# Patient Record
Sex: FEMALE | Race: WHITE | NOT HISPANIC OR LATINO | Employment: FULL TIME | URBAN - METROPOLITAN AREA
[De-identification: names, ages, dates, MRNs, and addresses within clinical notes are randomized per-mention and may not be internally consistent; named-entity substitution may affect disease eponyms.]

---

## 2017-02-27 ENCOUNTER — HOSPITAL ENCOUNTER (OUTPATIENT)
Dept: LAB | Facility: MEDICAL CENTER | Age: 37
End: 2017-02-27
Attending: FAMILY MEDICINE
Payer: COMMERCIAL

## 2017-02-27 LAB
25(OH)D3 SERPL-MCNC: 14 NG/ML (ref 30–100)
ALBUMIN SERPL BCP-MCNC: 4.2 G/DL (ref 3.2–4.9)
ALBUMIN/GLOB SERPL: 1.1 G/DL
ALP SERPL-CCNC: 72 U/L (ref 30–99)
ALT SERPL-CCNC: 27 U/L (ref 2–50)
ANION GAP SERPL CALC-SCNC: 8 MMOL/L (ref 0–11.9)
AST SERPL-CCNC: 21 U/L (ref 12–45)
BILIRUB SERPL-MCNC: 0.4 MG/DL (ref 0.1–1.5)
BUN SERPL-MCNC: 11 MG/DL (ref 8–22)
CALCIUM SERPL-MCNC: 9.5 MG/DL (ref 8.5–10.5)
CHLORIDE SERPL-SCNC: 104 MMOL/L (ref 96–112)
CO2 SERPL-SCNC: 26 MMOL/L (ref 20–33)
CREAT SERPL-MCNC: 0.68 MG/DL (ref 0.5–1.4)
ERYTHROCYTE [DISTWIDTH] IN BLOOD BY AUTOMATED COUNT: 45.1 FL (ref 35.9–50)
EST. AVERAGE GLUCOSE BLD GHB EST-MCNC: 114 MG/DL
GLOBULIN SER CALC-MCNC: 4 G/DL (ref 1.9–3.5)
GLUCOSE SERPL-MCNC: 86 MG/DL (ref 65–99)
HBA1C MFR BLD: 5.6 % (ref 0–5.6)
HCT VFR BLD AUTO: 43.5 % (ref 37–47)
HGB BLD-MCNC: 14.2 G/DL (ref 12–16)
MCH RBC QN AUTO: 30.7 PG (ref 27–33)
MCHC RBC AUTO-ENTMCNC: 32.6 G/DL (ref 33.6–35)
MCV RBC AUTO: 94 FL (ref 81.4–97.8)
PLATELET # BLD AUTO: 238 K/UL (ref 164–446)
PMV BLD AUTO: 10.3 FL (ref 9–12.9)
POTASSIUM SERPL-SCNC: 4 MMOL/L (ref 3.6–5.5)
PROT SERPL-MCNC: 8.2 G/DL (ref 6–8.2)
RBC # BLD AUTO: 4.63 M/UL (ref 4.2–5.4)
SODIUM SERPL-SCNC: 138 MMOL/L (ref 135–145)
TSH SERPL DL<=0.005 MIU/L-ACNC: 1.14 UIU/ML (ref 0.3–3.7)
WBC # BLD AUTO: 4.7 K/UL (ref 4.8–10.8)

## 2017-02-27 PROCEDURE — 82306 VITAMIN D 25 HYDROXY: CPT

## 2017-02-27 PROCEDURE — 36415 COLL VENOUS BLD VENIPUNCTURE: CPT

## 2017-02-27 PROCEDURE — 84443 ASSAY THYROID STIM HORMONE: CPT

## 2017-02-27 PROCEDURE — 83704 LIPOPROTEIN BLD QUAN PART: CPT

## 2017-02-27 PROCEDURE — 83695 ASSAY OF LIPOPROTEIN(A): CPT

## 2017-02-27 PROCEDURE — 83036 HEMOGLOBIN GLYCOSYLATED A1C: CPT

## 2017-02-27 PROCEDURE — 85027 COMPLETE CBC AUTOMATED: CPT

## 2017-02-27 PROCEDURE — 80061 LIPID PANEL: CPT

## 2017-02-27 PROCEDURE — 80053 COMPREHEN METABOLIC PANEL: CPT

## 2017-03-01 LAB — LPA SERPL-MCNC: 66 MG/DL

## 2017-03-02 LAB
CHOLEST SERPL-MCNC: 165 MG/DL (ref 100–199)
HDL PARTICAL NO Q4363: 29.2 UMOL/L
HDL SERPL QN: 9.6 NM
HDLC SERPL-MCNC: 47 MG/DL
HLD.LARGE SERPL-SCNC: 8.9 UMOL/L
LDL MED SERPL QN: 21.7 NM
LDL SERPL QN: 21.7 NM
LDL SERPL-SCNC: 1020 NMOL/L
LDL SMALL SERPL-SCNC: 335 NMOL/L
LDL SMALL SERPL-SCNC: 335 NMOL/L
LDLC SERPL CALC-MCNC: 99 MG/DL (ref 0–99)
LP IR SCORE Q4364: <25
TRIGL SERPL-MCNC: 94 MG/DL (ref 0–149)
VLDL LARGE SERPL-SCNC: 2.1 NMOL/L
VLDL SERPL QN: 42.1 NM

## 2017-08-28 ENCOUNTER — OFFICE VISIT (OUTPATIENT)
Dept: MEDICAL GROUP | Facility: PHYSICIAN GROUP | Age: 37
End: 2017-08-28
Payer: COMMERCIAL

## 2017-08-28 VITALS
OXYGEN SATURATION: 97 % | DIASTOLIC BLOOD PRESSURE: 72 MMHG | TEMPERATURE: 97.9 F | WEIGHT: 170 LBS | HEART RATE: 72 BPM | BODY MASS INDEX: 31.28 KG/M2 | SYSTOLIC BLOOD PRESSURE: 110 MMHG | HEIGHT: 62 IN | RESPIRATION RATE: 14 BRPM

## 2017-08-28 DIAGNOSIS — E66.9 OBESITY, CLASS I, BMI 30-34.9: ICD-10-CM

## 2017-08-28 DIAGNOSIS — Z00.00 WELL ADULT EXAM: ICD-10-CM

## 2017-08-28 DIAGNOSIS — Z13.0 SCREENING FOR DEFICIENCY ANEMIA: ICD-10-CM

## 2017-08-28 DIAGNOSIS — Z13.29 SCREENING FOR ENDOCRINE DISORDER: ICD-10-CM

## 2017-08-28 DIAGNOSIS — Z13.6 SCREENING FOR CARDIOVASCULAR CONDITION: ICD-10-CM

## 2017-08-28 PROCEDURE — 99395 PREV VISIT EST AGE 18-39: CPT | Performed by: FAMILY MEDICINE

## 2017-08-28 ASSESSMENT — PATIENT HEALTH QUESTIONNAIRE - PHQ9: CLINICAL INTERPRETATION OF PHQ2 SCORE: 0

## 2017-08-28 NOTE — PROGRESS NOTES
"Chief Complaint   Patient presents with   • Establish Care       HISTORY OF PRESENT ILLNESS: Patient is a 37 y.o. female established patient here today for the following concerns:    1. Well adult exam  Here today to establish care.  Reports that she has been healthy to date.  She takes metoprolol for SVT but has been under good control.  Has struggled with weight despite good exercise and diet changes.  Would like labs done.  Due for PAP with her gynecologist and plans to schedule appointment.          Past Medical, Social, and Family history reviewed and updated in EPIC    Allergies:Review of patient's allergies indicates no known allergies.    Current Outpatient Prescriptions   Medication Sig Dispense Refill   • metoprolol (LOPRESSOR) 50 MG TABS Take 50 mg by mouth every day.       No current facility-administered medications for this visit.          ROS:  Review of Systems   Constitutional: Negative for fever, chills, weight loss and malaise/fatigue.   HENT: Negative for ear pain, nosebleeds, congestion, sore throat and neck pain.    Eyes: Negative for blurred vision.   Respiratory: Negative for cough, sputum production, shortness of breath and wheezing.    Cardiovascular: Negative for chest pain, palpitations,  and leg swelling.   Gastrointestinal: Negative for heartburn, nausea, vomiting, diarrhea and abdominal pain.   Genitourinary: Negative for dysuria, urgency and frequency.   Musculoskeletal: Negative for myalgias, back pain and joint pain.   Skin: Negative for rash and itching.   Neurological: Negative for dizziness, tingling, tremors, sensory change, focal weakness and headaches.   Endo/Heme/Allergies: Does not bruise/bleed easily.   Psychiatric/Behavioral: Negative for depression, anxiety, suicidal ideas, insomnia and memory loss.      Exam:  Blood pressure 110/72, pulse 72, temperature 36.6 °C (97.9 °F), resp. rate 14, height 1.575 m (5' 2\"), weight 77.1 kg (170 lb), SpO2 97 %.    General:  Well " nourished, well developed in NAD  Head is grossly normal.  Neck: Supple without JVD   Pulmonary:  Normal effort. CTAB  Cardiovascular: Regular rate and rhythm  Extremities: no clubbing, cyanosis, or edema.  Psych: affect appropriate  Abdomen: positive bowel sounds.  Non tender, non distended, no hepatosplenomegaly.       Please note that this dictation was created using voice recognition software. I have made every reasonable attempt to correct obvious errors, but I expect that there are errors of grammar and possibly content that I did not discover before finalizing the note.    Assessment/Plan:  1. Well adult exam  Continue healthy lifetsyle  Check labs  Flu vaccine when available.  Call gyn for pap  - HEMOGLOBIN A1C; Future  - COMP METABOLIC PANEL; Future  - LIPID PROFILE; Future  - TSH; Future  - FREE THYROXINE; Future  - CBC WITH DIFFERENTIAL; Future  - THYROID PEROXIDASE  (TPO) AB; Future  - VITAMIN D 25-HYDROXY    2. Obesity, Class I, BMI 30-34.9    - Patient identified as having weight management issue.  Appropriate orders and counseling given.  - HEMOGLOBIN A1C; Future  - COMP METABOLIC PANEL; Future  - LIPID PROFILE; Future  - TSH; Future  - FREE THYROXINE; Future  - THYROID PEROXIDASE  (TPO) AB; Future    3. Screening for cardiovascular condition    - LIPID PROFILE; Future    4. Screening for endocrine disorder    - VITAMIN D 25-HYDROXY    5. Screening for deficiency anemia    - CBC WITH DIFFERENTIAL; Future    Follow up prn

## 2017-08-29 ENCOUNTER — HOSPITAL ENCOUNTER (OUTPATIENT)
Facility: MEDICAL CENTER | Age: 37
End: 2017-08-29
Attending: FAMILY MEDICINE
Payer: COMMERCIAL

## 2017-08-29 DIAGNOSIS — Z13.0 SCREENING FOR DEFICIENCY ANEMIA: ICD-10-CM

## 2017-08-29 DIAGNOSIS — Z00.00 WELL ADULT EXAM: ICD-10-CM

## 2017-08-29 DIAGNOSIS — Z13.6 SCREENING FOR CARDIOVASCULAR CONDITION: ICD-10-CM

## 2017-08-29 DIAGNOSIS — E66.9 OBESITY, CLASS I, BMI 30-34.9: ICD-10-CM

## 2017-08-29 LAB
25(OH)D3 SERPL-MCNC: 24 NG/ML (ref 30–100)
ALBUMIN SERPL BCP-MCNC: 4.3 G/DL (ref 3.2–4.9)
ALBUMIN/GLOB SERPL: 1.1 G/DL
ALP SERPL-CCNC: 83 U/L (ref 30–99)
ALT SERPL-CCNC: 12 U/L (ref 2–50)
ANION GAP SERPL CALC-SCNC: 7 MMOL/L (ref 0–11.9)
AST SERPL-CCNC: 16 U/L (ref 12–45)
BASOPHILS # BLD AUTO: 0.3 % (ref 0–1.8)
BASOPHILS # BLD: 0.02 K/UL (ref 0–0.12)
BILIRUB SERPL-MCNC: 0.5 MG/DL (ref 0.1–1.5)
BUN SERPL-MCNC: 12 MG/DL (ref 8–22)
CALCIUM SERPL-MCNC: 9.5 MG/DL (ref 8.5–10.5)
CHLORIDE SERPL-SCNC: 103 MMOL/L (ref 96–112)
CHOLEST SERPL-MCNC: 175 MG/DL (ref 100–199)
CO2 SERPL-SCNC: 26 MMOL/L (ref 20–33)
CREAT SERPL-MCNC: 0.72 MG/DL (ref 0.5–1.4)
EOSINOPHIL # BLD AUTO: 0.13 K/UL (ref 0–0.51)
EOSINOPHIL NFR BLD: 2.1 % (ref 0–6.9)
ERYTHROCYTE [DISTWIDTH] IN BLOOD BY AUTOMATED COUNT: 42.5 FL (ref 35.9–50)
EST. AVERAGE GLUCOSE BLD GHB EST-MCNC: 114 MG/DL
GFR SERPL CREATININE-BSD FRML MDRD: >60 ML/MIN/1.73 M 2
GLOBULIN SER CALC-MCNC: 3.8 G/DL (ref 1.9–3.5)
GLUCOSE SERPL-MCNC: 89 MG/DL (ref 65–99)
HBA1C MFR BLD: 5.6 % (ref 0–5.6)
HCT VFR BLD AUTO: 44.2 % (ref 37–47)
HDLC SERPL-MCNC: 51 MG/DL
HGB BLD-MCNC: 14.3 G/DL (ref 12–16)
IMM GRANULOCYTES # BLD AUTO: 0.01 K/UL (ref 0–0.11)
IMM GRANULOCYTES NFR BLD AUTO: 0.2 % (ref 0–0.9)
LDLC SERPL CALC-MCNC: 104 MG/DL
LYMPHOCYTES # BLD AUTO: 2.16 K/UL (ref 1–4.8)
LYMPHOCYTES NFR BLD: 35 % (ref 22–41)
MCH RBC QN AUTO: 29.4 PG (ref 27–33)
MCHC RBC AUTO-ENTMCNC: 32.4 G/DL (ref 33.6–35)
MCV RBC AUTO: 90.8 FL (ref 81.4–97.8)
MONOCYTES # BLD AUTO: 0.48 K/UL (ref 0–0.85)
MONOCYTES NFR BLD AUTO: 7.8 % (ref 0–13.4)
NEUTROPHILS # BLD AUTO: 3.38 K/UL (ref 2–7.15)
NEUTROPHILS NFR BLD: 54.6 % (ref 44–72)
NRBC # BLD AUTO: 0 K/UL
NRBC BLD AUTO-RTO: 0 /100 WBC
PLATELET # BLD AUTO: 256 K/UL (ref 164–446)
PMV BLD AUTO: 10.5 FL (ref 9–12.9)
POTASSIUM SERPL-SCNC: 3.6 MMOL/L (ref 3.6–5.5)
PROT SERPL-MCNC: 8.1 G/DL (ref 6–8.2)
RBC # BLD AUTO: 4.87 M/UL (ref 4.2–5.4)
SODIUM SERPL-SCNC: 136 MMOL/L (ref 135–145)
T4 FREE SERPL-MCNC: 0.69 NG/DL (ref 0.53–1.43)
THYROPEROXIDASE AB SERPL-ACNC: 2.5 IU/ML (ref 0–9)
TRIGL SERPL-MCNC: 98 MG/DL (ref 0–149)
TSH SERPL DL<=0.005 MIU/L-ACNC: 1.33 UIU/ML (ref 0.3–3.7)
WBC # BLD AUTO: 6.2 K/UL (ref 4.8–10.8)

## 2017-08-29 PROCEDURE — 84443 ASSAY THYROID STIM HORMONE: CPT

## 2017-08-29 PROCEDURE — 82306 VITAMIN D 25 HYDROXY: CPT

## 2017-08-29 PROCEDURE — 85025 COMPLETE CBC W/AUTO DIFF WBC: CPT

## 2017-08-29 PROCEDURE — 83036 HEMOGLOBIN GLYCOSYLATED A1C: CPT

## 2017-08-29 PROCEDURE — 80053 COMPREHEN METABOLIC PANEL: CPT

## 2017-08-29 PROCEDURE — 80061 LIPID PANEL: CPT

## 2017-08-29 PROCEDURE — 86376 MICROSOMAL ANTIBODY EACH: CPT

## 2017-08-29 PROCEDURE — 84439 ASSAY OF FREE THYROXINE: CPT

## 2017-09-17 ENCOUNTER — EH NON-PROVIDER (OUTPATIENT)
Dept: OCCUPATIONAL MEDICINE | Facility: CLINIC | Age: 37
End: 2017-09-17

## 2017-09-17 DIAGNOSIS — Z29.89 NEED FOR ISOLATION: ICD-10-CM

## 2017-09-17 PROCEDURE — 94375 RESPIRATORY FLOW VOLUME LOOP: CPT

## 2017-09-20 ENCOUNTER — EH NON-PROVIDER (OUTPATIENT)
Dept: OCCUPATIONAL MEDICINE | Facility: CLINIC | Age: 37
End: 2017-09-20

## 2017-09-20 ENCOUNTER — NON-PROVIDER VISIT (OUTPATIENT)
Dept: URGENT CARE | Facility: PHYSICIAN GROUP | Age: 37
End: 2017-09-20

## 2017-09-20 ENCOUNTER — HOSPITAL ENCOUNTER (OUTPATIENT)
Dept: LAB | Facility: MEDICAL CENTER | Age: 37
End: 2017-09-20
Attending: PREVENTIVE MEDICINE
Payer: COMMERCIAL

## 2017-09-20 DIAGNOSIS — Z23 NEED FOR INFLUENZA VACCINATION: ICD-10-CM

## 2017-09-20 DIAGNOSIS — Z02.89 ENCOUNTER FOR OCCUPATIONAL HEALTH EXAMINATION: ICD-10-CM

## 2017-09-20 PROCEDURE — 86480 TB TEST CELL IMMUN MEASURE: CPT | Performed by: PREVENTIVE MEDICINE

## 2017-09-20 PROCEDURE — 90686 IIV4 VACC NO PRSV 0.5 ML IM: CPT | Performed by: NURSE PRACTITIONER

## 2017-09-20 NOTE — NON-PROVIDER
"Yudy De Jesus is a 37 y.o. female here for a non-provider visit for:   FLU    Reason for immunization: Annual Flu Vaccine  Immunization records indicate need for vaccine: Yes, confirmed with Epic  Minimum interval has been met for this vaccine: Yes  ABN completed: Not Indicated    Order and dose verified by: VS   VIS Dated  08/07/2015 was given to patient: Yes  All IAC Questionnaire questions were answered \"No.\"    Patient tolerated injection and no adverse effects were observed or reported: Yes    Pt scheduled for next dose in series: Not Indicated    "

## 2017-09-22 LAB
M TB TUBERC IFN-G BLD QL: NEGATIVE
M TB TUBERC IFN-G/MITOGEN IGNF BLD: -0
M TB TUBERC IGNF/MITOGEN IGNF CONTROL: 61.09 [IU]/ML
MITOGEN IGNF BCKGRD COR BLD-ACNC: 0.07 [IU]/ML

## 2017-10-23 ENCOUNTER — OFFICE VISIT (OUTPATIENT)
Dept: URGENT CARE | Facility: PHYSICIAN GROUP | Age: 37
End: 2017-10-23
Payer: COMMERCIAL

## 2017-10-23 VITALS
DIASTOLIC BLOOD PRESSURE: 62 MMHG | WEIGHT: 171 LBS | BODY MASS INDEX: 31.47 KG/M2 | RESPIRATION RATE: 16 BRPM | OXYGEN SATURATION: 98 % | SYSTOLIC BLOOD PRESSURE: 102 MMHG | HEIGHT: 62 IN | TEMPERATURE: 98.5 F | HEART RATE: 75 BPM

## 2017-10-23 DIAGNOSIS — J04.0 LARYNGITIS: ICD-10-CM

## 2017-10-23 DIAGNOSIS — J06.9 VIRAL URI WITH COUGH: ICD-10-CM

## 2017-10-23 PROCEDURE — 99214 OFFICE O/P EST MOD 30 MIN: CPT | Performed by: PHYSICIAN ASSISTANT

## 2017-10-23 RX ORDER — CODEINE PHOSPHATE AND GUAIFENESIN 10; 100 MG/5ML; MG/5ML
5 SOLUTION ORAL EVERY 4 HOURS PRN
Qty: 100 ML | Refills: 0 | Status: SHIPPED | OUTPATIENT
Start: 2017-10-23 | End: 2018-08-02

## 2017-10-23 ASSESSMENT — ENCOUNTER SYMPTOMS
WHEEZING: 0
NEUROLOGICAL NEGATIVE: 1
SHORTNESS OF BREATH: 0
FEVER: 0
COUGH: 1
SORE THROAT: 1
MYALGIAS: 0
HOARSE VOICE: 1
CHILLS: 0
TROUBLE SWALLOWING: 1
CARDIOVASCULAR NEGATIVE: 1
GASTROINTESTINAL NEGATIVE: 1
SPUTUM PRODUCTION: 1
SWOLLEN GLANDS: 1

## 2017-10-23 NOTE — PROGRESS NOTES
Subjective:      Yudy De Jesus is a 37 y.o. female who presents with Laryngitis (x2 days, cough, sore throat x1 week)            Pharyngitis    This is a new problem. The current episode started in the past 7 days. The problem has been unchanged. There has been no fever. The fever has been present for less than 1 day. Associated symptoms include congestion, coughing, a hoarse voice, swollen glands and trouble swallowing. Pertinent negatives include no ear pain or shortness of breath. She has tried nothing (Theraflu) for the symptoms. The treatment provided mild relief.   Laryngitis, cold symptoms, dry cough    PMH:  has a past medical history of Arrhythmia.  MEDS:   Current Outpatient Prescriptions:   •  metoprolol (LOPRESSOR) 50 MG TABS, Take 50 mg by mouth every day., Disp: , Rfl:   ALLERGIES: No Known Allergies  SURGHX:   Past Surgical History:   Procedure Laterality Date   • HYSTEROSCOPY NOVASURE-2 N/A 3/3/2016    Procedure: HYSTEROSCOPY NOVASURE;  Surgeon: David Berry M.D.;  Location: SURGERY SAME DAY [facility];  Service:    • GYN SURGERY  6/2007    C section     SOCHX:  reports that she quit smoking about 21 months ago. Her smoking use included Cigarettes. She has never used smokeless tobacco. She reports that she drinks alcohol. She reports that she does not use drugs.  FH: family history is not on file.      Review of Systems   Constitutional: Negative for chills and fever.   HENT: Positive for congestion, hoarse voice, sore throat and trouble swallowing. Negative for ear pain.    Respiratory: Positive for cough and sputum production. Negative for shortness of breath and wheezing.    Cardiovascular: Negative.    Gastrointestinal: Negative.    Musculoskeletal: Negative for joint pain and myalgias.   Neurological: Negative.        Medications, Allergies, and current problem list reviewed today in Epic     Objective:     /62   Pulse 75   Temp 36.9 °C (98.5 °F)   Resp 16   Ht 1.575 m (5'  "2\")   Wt 77.6 kg (171 lb)   SpO2 98%   Breastfeeding? No   BMI 31.28 kg/m²      Physical Exam   Constitutional: She is oriented to person, place, and time. She appears well-developed and well-nourished. No distress.   HENT:   Head: Normocephalic and atraumatic.   Right Ear: Tympanic membrane and external ear normal.   Left Ear: Tympanic membrane and external ear normal.   Nose: Nose normal.   Mouth/Throat: Posterior oropharyngeal edema and posterior oropharyngeal erythema present. No oropharyngeal exudate. No tonsillar exudate.   Hoarse voice   Eyes: Conjunctivae are normal. Right eye exhibits no discharge. Left eye exhibits no discharge.   Neck: Normal range of motion. Neck supple.   Cardiovascular: Normal rate, regular rhythm and normal heart sounds.    Pulmonary/Chest: Effort normal and breath sounds normal. No respiratory distress. She has no wheezes.   Musculoskeletal: Normal range of motion.   Lymphadenopathy:     She has no cervical adenopathy.   Neurological: She is alert and oriented to person, place, and time.   Skin: Skin is warm and dry. She is not diaphoretic.   Psychiatric: She has a normal mood and affect. Her behavior is normal. Judgment and thought content normal.   Nursing note and vitals reviewed.              Assessment/Plan:     1. Laryngitis  guaifenesin-codeine (ROBITUSSIN AC) Solution oral solution   2. Viral URI with cough  guaifenesin-codeine (ROBITUSSIN AC) Solution oral solution     Vitals normal, no signs of bacterial infection.  Cough medicine for nighttime  [facility] Aware web site evaluation: I have obtained and reviewed patient utilization report from [facility] pharmacy database prior to writing prescription for controlled substance II, III or IV. Based on the report and my clinical assessment the prescription is medically necessary.   Patient is cautioned on sedation potential of narcotic medication; no drinking, driving or operating heavy machinery while on this " medication.  OTC meds and conservative measures as discussed  Voice rest, note for work  Return to clinic or go to ED if symptoms worsen or persist. Indications for ED discussed at length. Patient voices understanding. Follow-up with your primary care provider in 3-5 days. Red flags discussed.    Please note that this dictation was created using voice recognition software. I have made every reasonable attempt to correct obvious errors, but I expect that there are errors of grammar and possibly content that I did not discover before finalizing the note.

## 2017-10-23 NOTE — LETTER
October 23, 2017         Patient: Yudy De Jesus   YOB: 1980   Date of Visit: 10/23/2017           To Whom it May Concern:    Yudy De Jesus was seen in my clinic on 10/23/2017. She may return to work on Thursday Oct. 26th.    If you have any questions or concerns, please don't hesitate to call.        Sincerely,           Ky Driscoll P.A.-C.  Electronically Signed

## 2018-08-02 ENCOUNTER — OFFICE VISIT (OUTPATIENT)
Dept: MEDICAL GROUP | Facility: PHYSICIAN GROUP | Age: 38
End: 2018-08-02
Payer: COMMERCIAL

## 2018-08-02 VITALS
WEIGHT: 175 LBS | TEMPERATURE: 97.4 F | OXYGEN SATURATION: 97 % | RESPIRATION RATE: 14 BRPM | HEIGHT: 62 IN | BODY MASS INDEX: 32.2 KG/M2 | SYSTOLIC BLOOD PRESSURE: 112 MMHG | HEART RATE: 78 BPM | DIASTOLIC BLOOD PRESSURE: 70 MMHG

## 2018-08-02 DIAGNOSIS — M75.82 ROTATOR CUFF TENDONITIS, LEFT: ICD-10-CM

## 2018-08-02 PROCEDURE — 99213 OFFICE O/P EST LOW 20 MIN: CPT | Performed by: FAMILY MEDICINE

## 2018-08-02 ASSESSMENT — PATIENT HEALTH QUESTIONNAIRE - PHQ9: CLINICAL INTERPRETATION OF PHQ2 SCORE: 0

## 2018-08-02 NOTE — PROGRESS NOTES
"Chief Complaint   Patient presents with   • Shoulder Pain     lft shoulder pain x 1.5 mo       HISTORY OF PRESENT ILLNESS: Patient is a 38 y.o. female established patient here today for the following concerns:    1. Rotator cuff tendonitis, left  About 6 weeks of left shoulder pain with any overhead movement laterally and forward flexion.  SHe reports that lately, she has been lifting weights.  No particular injury noted.  NO redness or swelling.  Has been taking ibuprofen in the am with improvement.        Past Medical, Social, and Family history reviewed and updated in EPIC    Allergies:Patient has no known allergies.    Current Outpatient Prescriptions   Medication Sig Dispense Refill   • metoprolol (LOPRESSOR) 50 MG TABS Take 50 mg by mouth every day.       No current facility-administered medications for this visit.          ROS:  Review of Systems   Constitutional: Negative for fever, chills, weight loss and malaise/fatigue.   HENT: Negative for ear pain, nosebleeds, congestion, sore throat and neck pain.    Eyes: Negative for blurred vision.   Respiratory: Negative for cough, sputum production, shortness of breath and wheezing.    Cardiovascular: Negative for chest pain, palpitations,  and leg swelling.   Gastrointestinal: Negative for heartburn, nausea, vomiting, diarrhea and abdominal pain.   Genitourinary: Negative for dysuria, urgency and frequency.   Musculoskeletal: Negative for myalgias, back pain and + joint pain.   Skin: Negative for rash and itching.   Neurological: Negative for dizziness, tingling, tremors, sensory change, focal weakness and headaches.   Endo/Heme/Allergies: Does not bruise/bleed easily.   Psychiatric/Behavioral: Negative for depression, anxiety, suicidal ideas, insomnia and memory loss.      Exam:  Blood pressure 112/70, pulse 78, temperature 36.3 °C (97.4 °F), resp. rate 14, height 1.575 m (5' 2\"), weight 79.4 kg (175 lb), SpO2 97 %.    General:  Well nourished, well developed in " NAD  Head is grossly normal.  Neck: Supple without JVD   Pulmonary:  Normal effort.   Cardiovascular: Regular rate  Extremities: no clubbing, cyanosis, or edema.  Psych: affect appropriate  MSK: left arm with full ROM, with some pain,  Negative empty can testing.      Please note that this dictation was created using voice recognition software. I have made every reasonable attempt to correct obvious errors, but I expect that there are errors of grammar and possibly content that I did not discover before finalizing the note.    Assessment/Plan:  1. Rotator cuff tendonitis, left  Suspect supraspinatus tendonitis.  Scheduled ibuprofen 600-800 mg BID with food, ROM exercises.  No indication for imaging.  If not improving in the next couple weeks will get formal PT involved.  No heavy lifting at this time.

## 2018-08-07 ENCOUNTER — DOCUMENTATION (OUTPATIENT)
Dept: OCCUPATIONAL MEDICINE | Facility: CLINIC | Age: 38
End: 2018-08-07

## 2018-08-29 ENCOUNTER — EH NON-PROVIDER (OUTPATIENT)
Dept: OCCUPATIONAL MEDICINE | Facility: CLINIC | Age: 38
End: 2018-08-29
Payer: COMMERCIAL

## 2018-08-29 ENCOUNTER — HOSPITAL ENCOUNTER (OUTPATIENT)
Facility: MEDICAL CENTER | Age: 38
End: 2018-08-29
Attending: PREVENTIVE MEDICINE
Payer: COMMERCIAL

## 2018-08-29 DIAGNOSIS — Z02.89 HEALTH EXAMINATION OF DEFINED SUBPOPULATION: ICD-10-CM

## 2018-08-29 PROCEDURE — 86480 TB TEST CELL IMMUN MEASURE: CPT | Performed by: PREVENTIVE MEDICINE

## 2018-08-31 LAB
M TB TUBERC IFN-G BLD QL: NEGATIVE
M TB TUBERC IFN-G/MITOGEN IGNF BLD: -0
M TB TUBERC IGNF/MITOGEN IGNF CONTROL: 41.64 [IU]/ML
MITOGEN IGNF BCKGRD COR BLD-ACNC: 0.04 [IU]/ML

## 2018-09-06 ENCOUNTER — EH NON-PROVIDER (OUTPATIENT)
Dept: OCCUPATIONAL MEDICINE | Facility: CLINIC | Age: 38
End: 2018-09-06

## 2018-09-06 DIAGNOSIS — Z02.89 ENCOUNTER FOR OCCUPATIONAL HEALTH EXAMINATION INVOLVING RESPIRATOR: Primary | ICD-10-CM

## 2018-09-06 PROCEDURE — 94375 RESPIRATORY FLOW VOLUME LOOP: CPT | Performed by: PREVENTIVE MEDICINE

## 2018-09-09 ENCOUNTER — OFFICE VISIT (OUTPATIENT)
Dept: URGENT CARE | Facility: PHYSICIAN GROUP | Age: 38
End: 2018-09-09
Payer: COMMERCIAL

## 2018-09-09 VITALS
SYSTOLIC BLOOD PRESSURE: 110 MMHG | HEIGHT: 62 IN | HEART RATE: 80 BPM | TEMPERATURE: 97.6 F | BODY MASS INDEX: 31.83 KG/M2 | RESPIRATION RATE: 14 BRPM | OXYGEN SATURATION: 98 % | WEIGHT: 173 LBS | DIASTOLIC BLOOD PRESSURE: 70 MMHG

## 2018-09-09 DIAGNOSIS — J01.00 ACUTE NON-RECURRENT MAXILLARY SINUSITIS: ICD-10-CM

## 2018-09-09 PROCEDURE — 99214 OFFICE O/P EST MOD 30 MIN: CPT | Performed by: FAMILY MEDICINE

## 2018-09-09 RX ORDER — AMOXICILLIN AND CLAVULANATE POTASSIUM 875; 125 MG/1; MG/1
1 TABLET, FILM COATED ORAL 2 TIMES DAILY
Qty: 14 TAB | Refills: 0 | Status: SHIPPED | OUTPATIENT
Start: 2018-09-09 | End: 2018-09-16

## 2018-09-09 RX ORDER — AZITHROMYCIN 250 MG/1
TABLET, FILM COATED ORAL
Qty: 6 TAB | Refills: 0 | Status: CANCELLED | OUTPATIENT
Start: 2018-09-09

## 2018-09-09 ASSESSMENT — ENCOUNTER SYMPTOMS
SHORTNESS OF BREATH: 0
CHILLS: 0
RHINORRHEA: 0
HEADACHES: 1
COUGH: 1
WHEEZING: 0
FEVER: 0

## 2018-09-09 ASSESSMENT — PAIN SCALES - GENERAL: PAINLEVEL: NO PAIN

## 2018-09-09 NOTE — PROGRESS NOTES
"Subjective:   Yudy De Jesus is a 38 y.o. female who presents for Cough (nasal, congestion, sorethroat, loss of voice, x 4 days)        Cough   This is a new problem. The current episode started in the past 7 days. The problem has been rapidly worsening. The problem occurs every few minutes. The cough is productive of sputum. Associated symptoms include headaches, nasal congestion and postnasal drip. Pertinent negatives include no chills, fever, rhinorrhea, shortness of breath or wheezing.     Review of Systems   Constitutional: Negative for chills and fever.   HENT: Positive for postnasal drip. Negative for rhinorrhea.    Respiratory: Positive for cough. Negative for shortness of breath and wheezing.    Neurological: Positive for headaches.     No Known Allergies   Objective:   /70   Pulse 80   Temp 36.4 °C (97.6 °F)   Resp 14   Ht 1.575 m (5' 2\")   Wt 78.5 kg (173 lb)   SpO2 98%   BMI 31.64 kg/m²   Physical Exam   Constitutional: She is oriented to person, place, and time. She appears well-developed and well-nourished. No distress.   HENT:   Head: Normocephalic and atraumatic.   Nose: Mucosal edema and rhinorrhea present. Right sinus exhibits maxillary sinus tenderness. Left sinus exhibits maxillary sinus tenderness.   Eyes: Pupils are equal, round, and reactive to light. Conjunctivae and EOM are normal.   Cardiovascular: Normal rate and regular rhythm.    No murmur heard.  Pulmonary/Chest: Effort normal and breath sounds normal. No respiratory distress.   Abdominal: Soft. She exhibits no distension. There is no tenderness.   Neurological: She is alert and oriented to person, place, and time. She has normal reflexes. No sensory deficit.   Skin: Skin is warm and dry.   Psychiatric: She has a normal mood and affect.         Assessment/Plan:   Assessment    1. Acute non-recurrent maxillary sinusitis  - amoxicillin-clavulanate (AUGMENTIN) 875-125 MG Tab; Take 1 Tab by mouth 2 times a day for 7 days. "  Dispense: 14 Tab; Refill: 0    Differential diagnosis, natural history, supportive care, and indications for immediate follow-up discussed.

## 2018-09-09 NOTE — LETTER
September 9, 2018         Patient: Yudy De Jesus   YOB: 1980   Date of Visit: 9/9/2018           To Whom it May Concern:    Yudy De Jesus was seen in my clinic on 9/9/2018. She may return to work on 9/12/2018..    If you have any questions or concerns, please don't hesitate to call.        Sincerely,           Abran Abbasi M.D.  Electronically Signed

## 2018-10-10 ENCOUNTER — NON-PROVIDER VISIT (OUTPATIENT)
Dept: URGENT CARE | Facility: PHYSICIAN GROUP | Age: 38
End: 2018-10-10
Payer: COMMERCIAL

## 2018-10-10 DIAGNOSIS — Z23 NEED FOR VACCINATION: ICD-10-CM

## 2018-10-10 PROCEDURE — 90471 IMMUNIZATION ADMIN: CPT | Performed by: FAMILY MEDICINE

## 2018-10-10 PROCEDURE — 90686 IIV4 VACC NO PRSV 0.5 ML IM: CPT | Performed by: FAMILY MEDICINE

## 2018-10-29 ENCOUNTER — OFFICE VISIT (OUTPATIENT)
Dept: URGENT CARE | Facility: PHYSICIAN GROUP | Age: 38
End: 2018-10-29
Payer: COMMERCIAL

## 2018-10-29 ENCOUNTER — HOSPITAL ENCOUNTER (OUTPATIENT)
Dept: RADIOLOGY | Facility: MEDICAL CENTER | Age: 38
End: 2018-10-29
Attending: PHYSICIAN ASSISTANT
Payer: COMMERCIAL

## 2018-10-29 VITALS
SYSTOLIC BLOOD PRESSURE: 110 MMHG | HEIGHT: 62 IN | TEMPERATURE: 97.1 F | DIASTOLIC BLOOD PRESSURE: 64 MMHG | OXYGEN SATURATION: 97 % | WEIGHT: 173 LBS | RESPIRATION RATE: 12 BRPM | HEART RATE: 87 BPM | BODY MASS INDEX: 31.83 KG/M2

## 2018-10-29 DIAGNOSIS — R10.13 ACUTE EPIGASTRIC PAIN: ICD-10-CM

## 2018-10-29 DIAGNOSIS — K21.9 GERD WITHOUT ESOPHAGITIS: Primary | ICD-10-CM

## 2018-10-29 PROCEDURE — 99214 OFFICE O/P EST MOD 30 MIN: CPT | Performed by: PHYSICIAN ASSISTANT

## 2018-10-29 PROCEDURE — 74019 RADEX ABDOMEN 2 VIEWS: CPT

## 2018-10-29 ASSESSMENT — ENCOUNTER SYMPTOMS
VOMITING: 0
CHILLS: 0
HEARTBURN: 1
CONSTIPATION: 1
COUGH: 0
NAUSEA: 0
FLATUS: 0
ABDOMINAL PAIN: 1
FEVER: 0
BLOOD IN STOOL: 0
DIARRHEA: 0
ANOREXIA: 1

## 2018-10-29 NOTE — PROGRESS NOTES
Subjective:      Yudy De Jesus is a 38 y.o. female who presents with Abdominal Pain (Upper/Mid abdominal pain, constipation x1day )    PMH:  has a past medical history of Arrhythmia.  MEDS:   Current Outpatient Prescriptions:   •  metoprolol (LOPRESSOR) 50 MG TABS, Take 50 mg by mouth every day., Disp: , Rfl:   ALLERGIES: No Known Allergies  SURGHX:   Past Surgical History:   Procedure Laterality Date   • HYSTEROSCOPY NOVASURE-2 N/A 3/3/2016    Procedure: HYSTEROSCOPY NOVASURE;  Surgeon: David Berry M.D.;  Location: SURGERY SAME DAY [facility];  Service:    • GYN SURGERY  6/2007    C section     SOCHX:  reports that she quit smoking about 2 years ago. Her smoking use included Cigarettes. She has never used smokeless tobacco. She reports that she drinks alcohol. She reports that she does not use drugs.  FH:  Reviewed with patient/family. Not pertinent to this complaint.          Patient presents clinic today with a complaint of epigastric abdominal pain since last night.  Patient states she has been experiencing intermittent epigastric pain times 3 weeks.  Patient has not tried any over-the-counter medications for her symptoms.  Patient has also been experiencing some intermittent constipation for which she did take some Ex-Lax, and stated she had a little bit of relief of her symptoms after taking the Ex-Lax.  Patient states her last bowel movement was 4 days ago.  Patient denies any urinary symptoms, fever chills, vomiting or diarrhea.  Patient denies dark stools.  Patient had a cholecystectomy 12 years ago without any sequela.      Abdominal Pain   This is a new problem. The current episode started yesterday. The onset quality is sudden. The problem occurs constantly. The problem has been waxing and waning. The pain is located in the epigastric region. The pain is at a severity of 6/10. The pain is moderate. The quality of the pain is burning, aching, colicky and a sensation of fullness. The abdominal  "pain radiates to the epigastric region. Associated symptoms include anorexia and constipation. Pertinent negatives include no diarrhea, fever, flatus, melena, nausea or vomiting. The pain is aggravated by certain positions and palpation. The pain is relieved by bowel movements and certain positions. Treatments tried: laxitive. The treatment provided mild relief. Her past medical history is significant for abdominal surgery (2 c sections ) and gallstones. There is no history of pancreatitis or PUD.       Review of Systems   Constitutional: Negative for chills and fever.   Respiratory: Negative for cough.    Gastrointestinal: Positive for abdominal pain, anorexia, constipation and heartburn. Negative for blood in stool, diarrhea, flatus, melena, nausea and vomiting.   All other systems reviewed and are negative.         Objective:     /64 (BP Location: Left arm, Patient Position: Sitting, BP Cuff Size: Adult)   Pulse 87   Temp 36.2 °C (97.1 °F) (Temporal)   Resp 12   Ht 1.575 m (5' 2\")   Wt 78.5 kg (173 lb)   SpO2 97%   BMI 31.64 kg/m²      Physical Exam   Constitutional: She is oriented to person, place, and time. She appears well-developed and well-nourished. No distress.   HENT:   Head: Normocephalic and atraumatic.   Nose: Nose normal.   Mouth/Throat: Oropharynx is clear and moist.   Eyes: Pupils are equal, round, and reactive to light. Conjunctivae and EOM are normal.   Neck: Normal range of motion. Neck supple.   Cardiovascular: Normal rate, regular rhythm and normal heart sounds.    Pulmonary/Chest: Effort normal and breath sounds normal.   Abdominal: Soft. Normal appearance. Bowel sounds are decreased. There is tenderness in the epigastric area. No hernia.       Musculoskeletal: Normal range of motion.   Neurological: She is alert and oriented to person, place, and time. Gait normal.   Skin: Skin is warm and dry. Capillary refill takes less than 2 seconds.   Psychiatric: She has a normal mood and " affect.   Nursing note and vitals reviewed.         Xray images viewed and interpreted by me, confirmed by radiology:    FINDINGS:  There is no evidence of bowel obstruction. There is a nonspecific bowel gas pattern. No free intraperitoneal air is identified. Patient is status post cholecystectomy. Visualized lung bases are clear.   Impression       No evidence of bowel obstruction. Nonspecific bowel gas pattern.   Reading Provider Reading Date   Chelita Clarke M.D. Oct 29, 2018   Signing Provider Signing Date Signing Time   Chelita Clarke M.D. Oct 29, 2018  9:13 AM          Assessment/Plan:     1. GERD without esophagitis     2. Acute epigastric pain  [id number] VIEWS    hyoscyamine-maalox plus-lidocaine viscous (GI COCKTAIL)       Pt states GI cocktail was not irritating to her pain but not particularly helpful.      OTC Pepcid or Zantac daily x 2 weeks.      PT should follow up with PCP in 1-2 days for re-evaluation if symptoms have not improved.  Discussed red flags and reasons to return to UC or ED.  Pt and/or family verbalized understanding of diagnosis and follow up instructions and was offered informational handout on diagnosis.  PT discharged.

## 2018-12-07 ENCOUNTER — OFFICE VISIT (OUTPATIENT)
Dept: MEDICAL GROUP | Facility: PHYSICIAN GROUP | Age: 38
End: 2018-12-07
Payer: COMMERCIAL

## 2018-12-07 VITALS
OXYGEN SATURATION: 97 % | RESPIRATION RATE: 12 BRPM | WEIGHT: 173 LBS | HEIGHT: 62 IN | TEMPERATURE: 97.3 F | SYSTOLIC BLOOD PRESSURE: 120 MMHG | BODY MASS INDEX: 31.83 KG/M2 | HEART RATE: 90 BPM | DIASTOLIC BLOOD PRESSURE: 82 MMHG

## 2018-12-07 DIAGNOSIS — G43.909 MIGRAINE WITHOUT STATUS MIGRAINOSUS, NOT INTRACTABLE, UNSPECIFIED MIGRAINE TYPE: ICD-10-CM

## 2018-12-07 DIAGNOSIS — I47.10 SVT (SUPRAVENTRICULAR TACHYCARDIA): ICD-10-CM

## 2018-12-07 PROCEDURE — 99214 OFFICE O/P EST MOD 30 MIN: CPT | Performed by: FAMILY MEDICINE

## 2018-12-07 RX ORDER — SUMATRIPTAN 100 MG/1
50-100 TABLET, FILM COATED ORAL
Qty: 10 TAB | Refills: 3 | Status: SHIPPED | OUTPATIENT
Start: 2018-12-07 | End: 2019-10-14 | Stop reason: SDUPTHER

## 2018-12-07 NOTE — PROGRESS NOTES
Chief Complaint   Patient presents with   • Migraine       HISTORY OF PRESENT ILLNESS: Patient is a 38 y.o. female established patient here today for the following concerns:    1. Migraine without status migrainosus, not intractable, unspecified migraine type  Here with about 7 years of migraines.  Reports that typically will take aleve and tylenol and try to rest.  It is always sinus pressure then left retro-orbital pain associated with light and noise sensitivity.  Associated with nausea, no vomiting and sometimes dizziness.     No visual changes.  Updated eye exam.      Continues on Metoprolol 50 mg for SVT.  Has not had any recurrence.  No CP.      Past Medical, Social, and Family history reviewed and updated in EPIC    Allergies:Patient has no known allergies.    Current Outpatient Prescriptions   Medication Sig Dispense Refill   • sumatriptan (IMITREX) 100 MG tablet Take 0.5-1 Tabs by mouth Once PRN for Migraine (May repeat for second dose in 2 hours if not improved) for up to 1 dose. 10 Tab 3   • metoprolol (LOPRESSOR) 50 MG TABS Take 50 mg by mouth every day.       No current facility-administered medications for this visit.          ROS:  Review of Systems   Constitutional: Negative for fever, chills, weight loss and malaise/fatigue.   HENT: Negative for ear pain, nosebleeds, congestion, sore throat and neck pain.    Eyes: Negative for blurred vision.   Respiratory: Negative for cough, sputum production, shortness of breath and wheezing.    Cardiovascular: Negative for chest pain, palpitations,  and leg swelling.   Gastrointestinal: Negative for heartburn, nausea, vomiting, diarrhea and abdominal pain.   Genitourinary: Negative for dysuria, urgency and frequency.   Musculoskeletal: Negative for myalgias, back pain and joint pain.   Skin: Negative for rash and itching.   Neurological: Negative for dizziness, tingling, tremors, sensory change, focal weakness and headaches.   Endo/Heme/Allergies: Does not  "bruise/bleed easily.   Psychiatric/Behavioral: Negative for depression, anxiety, suicidal ideas, insomnia and memory loss.      Exam:  Blood pressure 120/82, pulse 90, temperature 36.3 °C (97.3 °F), resp. rate 12, height 1.575 m (5' 2\"), weight 78.5 kg (173 lb), SpO2 97 %, not currently breastfeeding.    General:  Well nourished, well developed in NAD  Head is grossly normal.  Neck: Supple without JVD   Pulmonary:  Normal effort.   Cardiovascular: Regular rate  Extremities: no clubbing, cyanosis, or edema.  Psych: affect appropriate      Please note that this dictation was created using voice recognition software. I have made every reasonable attempt to correct obvious errors, but I expect that there are errors of grammar and possibly content that I did not discover before finalizing the note.    Assessment/Plan:  1. Migraine without status migrainosus, not intractable, unspecified migraine type  Start   - sumatriptan (IMITREX) 100 MG tablet; Take 0.5-1 Tabs by mouth Once PRN for Migraine (May repeat for second dose in 2 hours if not improved) for up to 1 dose.  Dispense: 10 Tab; Refill: 3    2. SVT  Continue metoprolol, may consider increasing if migraines don't calm down as prophylaxis.            "

## 2019-10-14 ENCOUNTER — PATIENT MESSAGE (OUTPATIENT)
Dept: MEDICAL GROUP | Facility: PHYSICIAN GROUP | Age: 39
End: 2019-10-14

## 2019-10-14 DIAGNOSIS — G43.909 MIGRAINE WITHOUT STATUS MIGRAINOSUS, NOT INTRACTABLE, UNSPECIFIED MIGRAINE TYPE: ICD-10-CM

## 2019-10-14 NOTE — PATIENT COMMUNICATION
Was the patient seen in the last year in this department? Yes    Does patient have an active prescription for medications requested? Yes    Received Request Via: Patient   2nd request

## 2019-10-14 NOTE — TELEPHONE ENCOUNTER
From: Yudy De Jesus  To: Debra Bravo M.D.  Sent: 10/14/2019 10:27 AM PDT  Subject: Prescription Question    Good morning Dr. Bravo    I just went to [facility] in [facility] in [place] and they don't have my sumatriptan rx. Can you please resend it.

## 2019-10-16 RX ORDER — SUMATRIPTAN 100 MG/1
50-100 TABLET, FILM COATED ORAL
Qty: 10 TAB | Refills: 2 | Status: SHIPPED | OUTPATIENT
Start: 2019-10-16 | End: 2020-07-20 | Stop reason: SDUPTHER

## 2020-07-20 ENCOUNTER — OFFICE VISIT (OUTPATIENT)
Dept: MEDICAL GROUP | Facility: PHYSICIAN GROUP | Age: 40
End: 2020-07-20
Payer: COMMERCIAL

## 2020-07-20 VITALS
HEART RATE: 68 BPM | TEMPERATURE: 97.9 F | SYSTOLIC BLOOD PRESSURE: 102 MMHG | HEIGHT: 62 IN | OXYGEN SATURATION: 97 % | WEIGHT: 177 LBS | DIASTOLIC BLOOD PRESSURE: 74 MMHG | RESPIRATION RATE: 14 BRPM | BODY MASS INDEX: 32.57 KG/M2

## 2020-07-20 DIAGNOSIS — G43.909 MIGRAINE WITHOUT STATUS MIGRAINOSUS, NOT INTRACTABLE, UNSPECIFIED MIGRAINE TYPE: ICD-10-CM

## 2020-07-20 DIAGNOSIS — I47.10 SVT (SUPRAVENTRICULAR TACHYCARDIA) (HCC): ICD-10-CM

## 2020-07-20 PROCEDURE — 99213 OFFICE O/P EST LOW 20 MIN: CPT | Performed by: FAMILY MEDICINE

## 2020-07-20 RX ORDER — SUMATRIPTAN 100 MG/1
50-100 TABLET, FILM COATED ORAL
Qty: 10 TAB | Refills: 11 | Status: SHIPPED | OUTPATIENT
Start: 2020-07-20 | End: 2022-01-30 | Stop reason: SDUPTHER

## 2020-07-20 RX ORDER — METOPROLOL TARTRATE 50 MG/1
50 TABLET, FILM COATED ORAL DAILY
Qty: 90 TAB | Refills: 3 | Status: SHIPPED | OUTPATIENT
Start: 2020-07-20 | End: 2022-10-31 | Stop reason: SDUPTHER

## 2020-07-20 ASSESSMENT — PATIENT HEALTH QUESTIONNAIRE - PHQ9: CLINICAL INTERPRETATION OF PHQ2 SCORE: 0

## 2020-07-20 NOTE — PROGRESS NOTES
Chief Complaint   Patient presents with   • Medication Refill       HISTORY OF PRESENT ILLNESS: Patient is a 40 y.o. female established patient here today for the following concerns:    1. Migraine without status migrainosus, not intractable, unspecified migraine type  This is a pleasant 40 year old female patient here today for medication review on her imitrex.  Reports no change in frequency, duration, or severity of migraines.  Still has good success with abortive therapy of imitrex.     2. SVT (supraventricular tachycardia) (Formerly [facility])  Has hx of SVT which has been well controlled with the use of metoprolol daily.  Has not had recent follow up with cardiology, but has been doing quite well with the medication.  No new palpitations no presyncope or syncope.  No CP.  No side effects to the metoprolol.       Past Medical, Social, and Family history reviewed and updated in EPIC    Allergies:Patient has no known allergies.    Current Outpatient Medications   Medication Sig Dispense Refill   • sumatriptan (IMITREX) 100 MG tablet Take 0.5-1 Tabs by mouth Once PRN for Migraine (May repeat for second dose in 2 hours if not improved) for up to 1 dose. 10 Tab 11   • metoprolol (LOPRESSOR) 50 MG Tab Take 1 Tab by mouth every day. 90 Tab 3     No current facility-administered medications for this visit.          ROS:  Review of Systems   Constitutional: Negative for fever, chills, weight loss and malaise/fatigue.   HENT: Negative for ear pain, nosebleeds, congestion, sore throat and neck pain.    Eyes: Negative for blurred vision.   Respiratory: Negative for cough, sputum production, shortness of breath and wheezing.    Cardiovascular: Negative for chest pain, palpitations,  and leg swelling.   Gastrointestinal: Negative for heartburn, nausea, vomiting, diarrhea and abdominal pain.   Genitourinary: Negative for dysuria, urgency and frequency.   Musculoskeletal: Negative for myalgias, back pain and joint pain.   Skin: Negative for  "rash and itching.   Neurological: Negative for dizziness, tingling, tremors, sensory change, focal weakness and headaches.   Endo/Heme/Allergies: Does not bruise/bleed easily.   Psychiatric/Behavioral: Negative for depression, anxiety, suicidal ideas, insomnia and memory loss.      Exam:  /74 (BP Location: Left arm, Patient Position: Sitting, BP Cuff Size: Adult)   Pulse 68   Temp 36.6 °C (97.9 °F) (Temporal)   Resp 14   Ht 1.575 m (5' 2\")   Wt 80.3 kg (177 lb)   SpO2 97%     General:  Well nourished, well developed in NAD  Head is grossly normal.  Neck: Supple without JVD   Pulmonary:  Normal effort. CTAB no w/r/c  Cardiovascular: Regular rate and rhythm no m/r/g  Extremities: no clubbing, cyanosis, or edema.  Psych: affect appropriate      Please note that this dictation was created using voice recognition software. I have made every reasonable attempt to correct obvious errors, but I expect that there are errors of grammar and possibly content that I did not discover before finalizing the note.    Assessment/Plan:  1. Migraine without status migrainosus, not intractable, unspecified migraine type  - sumatriptan (IMITREX) 100 MG tablet; Take 0.5-1 Tabs by mouth Once PRN for Migraine (May repeat for second dose in 2 hours if not improved) for up to 1 dose.  Dispense: 10 Tab; Refill: 11    2. SVT (supraventricular tachycardia) (HCC)  - metoprolol (LOPRESSOR) 50 MG Tab; Take 1 Tab by mouth every day.  Dispense: 90 Tab; Refill: 3    Annual follow up.  Reminded to get PAP, offered to do here if she cannot get into gyn.          "

## 2021-08-10 ENCOUNTER — HOSPITAL ENCOUNTER (OUTPATIENT)
Facility: MEDICAL CENTER | Age: 41
End: 2021-08-10
Attending: CLINICAL NURSE SPECIALIST
Payer: COMMERCIAL

## 2021-08-10 PROCEDURE — 87389 HIV-1 AG W/HIV-1&-2 AB AG IA: CPT

## 2021-08-10 PROCEDURE — 86780 TREPONEMA PALLIDUM: CPT

## 2021-08-10 PROCEDURE — 86706 HEP B SURFACE ANTIBODY: CPT

## 2021-08-10 PROCEDURE — 86803 HEPATITIS C AB TEST: CPT

## 2021-08-11 LAB
HBV SURFACE AB SERPL IA-ACNC: 3726 MIU/ML (ref 0–10)
HCV AB SER QL: NORMAL
HIV 1+2 AB+HIV1 P24 AG SERPL QL IA: NORMAL
TREPONEMA PALLIDUM IGG+IGM AB [PRESENCE] IN SERUM OR PLASMA BY IMMUNOASSAY: NORMAL

## 2022-01-30 ENCOUNTER — OFFICE VISIT (OUTPATIENT)
Dept: URGENT CARE | Facility: CLINIC | Age: 42
End: 2022-01-30
Payer: COMMERCIAL

## 2022-01-30 VITALS
RESPIRATION RATE: 18 BRPM | SYSTOLIC BLOOD PRESSURE: 110 MMHG | HEART RATE: 88 BPM | OXYGEN SATURATION: 98 % | DIASTOLIC BLOOD PRESSURE: 70 MMHG | TEMPERATURE: 97.1 F

## 2022-01-30 DIAGNOSIS — G43.909 MIGRAINE WITHOUT STATUS MIGRAINOSUS, NOT INTRACTABLE, UNSPECIFIED MIGRAINE TYPE: ICD-10-CM

## 2022-01-30 DIAGNOSIS — R11.2 NAUSEA AND VOMITING IN ADULT: ICD-10-CM

## 2022-01-30 PROCEDURE — 99213 OFFICE O/P EST LOW 20 MIN: CPT | Performed by: PHYSICIAN ASSISTANT

## 2022-01-30 RX ORDER — ONDANSETRON 2 MG/ML
4 INJECTION INTRAMUSCULAR; INTRAVENOUS ONCE
Status: DISCONTINUED | OUTPATIENT
Start: 2022-01-30 | End: 2022-01-30

## 2022-01-30 RX ORDER — KETOROLAC TROMETHAMINE 30 MG/ML
30 INJECTION, SOLUTION INTRAMUSCULAR; INTRAVENOUS ONCE
Status: COMPLETED | OUTPATIENT
Start: 2022-01-30 | End: 2022-01-30

## 2022-01-30 RX ORDER — ONDANSETRON 4 MG/1
4 TABLET, ORALLY DISINTEGRATING ORAL ONCE
Status: DISCONTINUED | OUTPATIENT
Start: 2022-01-30 | End: 2022-01-30

## 2022-01-30 RX ORDER — ONDANSETRON 8 MG/1
8 TABLET, ORALLY DISINTEGRATING ORAL EVERY 8 HOURS PRN
Qty: 15 TABLET | Refills: 3 | Status: SHIPPED | OUTPATIENT
Start: 2022-01-30 | End: 2022-09-27

## 2022-01-30 RX ORDER — ONDANSETRON 2 MG/ML
4 INJECTION INTRAMUSCULAR; INTRAVENOUS ONCE
Status: COMPLETED | OUTPATIENT
Start: 2022-01-30 | End: 2022-01-30

## 2022-01-30 RX ORDER — SUMATRIPTAN 100 MG/1
50-100 TABLET, FILM COATED ORAL
Qty: 10 TABLET | Refills: 11 | Status: SHIPPED | OUTPATIENT
Start: 2022-01-30 | End: 2022-09-27

## 2022-01-30 RX ADMIN — ONDANSETRON 4 MG: 2 INJECTION INTRAMUSCULAR; INTRAVENOUS at 16:09

## 2022-01-30 RX ADMIN — KETOROLAC TROMETHAMINE 30 MG: 30 INJECTION, SOLUTION INTRAMUSCULAR; INTRAVENOUS at 16:08

## 2022-01-30 ASSESSMENT — ENCOUNTER SYMPTOMS
MIGRAINE HEADACHES: 1
MUSCULOSKELETAL NEGATIVE: 1
NAUSEA: 1
ANOREXIA: 1
CARDIOVASCULAR NEGATIVE: 1
DOUBLE VISION: 0
FEVER: 0
PHOTOPHOBIA: 1
HEADACHES: 1
RESPIRATORY NEGATIVE: 1
BLURRED VISION: 0
VOMITING: 1

## 2022-01-30 NOTE — PROGRESS NOTES
Subjective     Yudy De Jesus is a 41 y.o. female who presents with Migraine (x15hrs, headache, nausea, sensitivity)    Medications:    • metoprolol tartrate Tabs  • sumatriptan    Allergies: Patient has no known allergies.    Problem List: Yudy De Jesus does not have any pertinent problems on file.    Surgical History:  Past Surgical History:   Procedure Laterality Date   • HYSTEROSCOPY NOVASURE-2 N/A 3/3/2016    Procedure: HYSTEROSCOPY NOVASURE;  Surgeon: David Berry M.D.;  Location: SURGERY SAME DAY [facility];  Service:    • GYN SURGERY  6/2007    C section       Past Social Hx: Yudy De Jesus  reports that she quit smoking about 6 years ago. Her smoking use included cigarettes. She has never used smokeless tobacco. She reports that she does not drink alcohol and does not use drugs.     Past Family Hx:  Yudy De Jesus family history is not on file.     Problem list, medications, and allergies reviewed by myself today in Epic.          Patient presents with:  Migraine: x15hrs, headache, nausea, sensitivity.  Patient has long history of migraine headaches, states this is a typical headache for her.  Patient states headache starts behind her left eye radiates to her ear.  Patient tried to take some of her sumatriptan which she uses for her migraine headache treatment but vomited it up due to the severity of the headache.  Patient is out of Zofran and would like a prescription of this as well as a prescription of her sumatriptan. Patient denies any fever, chills, chest pain, shortness of breath or recent illness.  Again patient states this is a typical migraine headache for her.        Headache   This is a new problem. The current episode started yesterday. The problem occurs constantly. The pain is located in the left unilateral and parietal region. The pain does not radiate. The pain quality is similar to prior headaches. The quality of the pain is described as aching and boring. The  pain is at a severity of 7/10. Associated symptoms include anorexia, nausea, phonophobia, photophobia and vomiting. Pertinent negatives include no blurred vision or fever. The symptoms are aggravated by bright light and noise. She has tried triptans for the symptoms. The treatment provided no relief. Her past medical history is significant for migraine headaches and migraines in the family.       Review of Systems   Constitutional: Negative for fever.   HENT: Negative.    Eyes: Positive for photophobia. Negative for blurred vision and double vision.   Respiratory: Negative.    Cardiovascular: Negative.    Gastrointestinal: Positive for anorexia, nausea and vomiting.   Musculoskeletal: Negative.    Neurological: Positive for headaches.   All other systems reviewed and are negative.             Objective     /70 (BP Location: Left arm, Patient Position: Sitting, BP Cuff Size: Adult)   Pulse 88   Temp 36.2 °C (97.1 °F) (Temporal)   Resp 18   SpO2 98%      Physical Exam  Vitals and nursing note reviewed.   Constitutional:       General: She is awake. She is not in acute distress.     Appearance: Normal appearance. She is well-developed and normal weight. She is not ill-appearing, toxic-appearing or diaphoretic.   HENT:      Head: Normocephalic and atraumatic.      Right Ear: Tympanic membrane normal.      Left Ear: Tympanic membrane normal.      Nose: Nose normal.      Mouth/Throat:      Lips: Pink.      Mouth: Mucous membranes are moist.      Pharynx: Oropharynx is clear. Uvula midline.   Eyes:      General: Lids are normal.      Extraocular Movements: Extraocular movements intact.      Conjunctiva/sclera: Conjunctivae normal.      Pupils: Pupils are equal, round, and reactive to light.   Cardiovascular:      Rate and Rhythm: Normal rate and regular rhythm.      Pulses: Normal pulses.      Heart sounds: Normal heart sounds.   Pulmonary:      Effort: Pulmonary effort is normal.      Breath sounds: Normal  breath sounds.   Abdominal:      General: Bowel sounds are normal.      Palpations: Abdomen is soft.   Musculoskeletal:         General: Normal range of motion.      Cervical back: Full passive range of motion without pain, normal range of motion and neck supple. No rigidity.   Skin:     General: Skin is warm and dry.      Capillary Refill: Capillary refill takes less than 2 seconds.   Neurological:      General: No focal deficit present.      Mental Status: She is alert and oriented to person, place, and time.      GCS: GCS eye subscore is 4. GCS verbal subscore is 5. GCS motor subscore is 6.      Cranial Nerves: Cranial nerves are intact. No cranial nerve deficit.      Sensory: Sensation is intact. No sensory deficit.      Motor: Motor function is intact.      Coordination: Coordination is intact.      Gait: Gait is intact. Gait normal.   Psychiatric:         Mood and Affect: Mood normal.         Behavior: Behavior is cooperative.                   Assessment & Plan              1. Migraine without status migrainosus, not intractable, unspecified migraine type  ketorolac (TORADOL) injection 30 mg    ondansetron (ZOFRAN ODT) 8 MG TABLET DISPERSIBLE    sumatriptan (IMITREX) 100 MG tablet    ondansetron (ZOFRAN) syringe/vial injection 4 mg             2. Nausea and vomiting in adult  ketorolac (TORADOL) injection 30 mg    ondansetron (ZOFRAN ODT) 8 MG TABLET DISPERSIBLE    ondansetron (ZOFRAN) syringe/vial injection 4 mg               Patient was evaluated in clinic today while wearing appropriate personal protective equipment.      Patient had significant improvement with Zofran and Toradol injection.    PT to begin prescription medications today as discussed.      PT should follow up with PCP in 1-2 days for re-evaluation if symptoms have not improved.      Discussed red flags and reasons to return to UC or ED.      Pt and/or family verbalized understanding of diagnosis and follow up instructions and was offered  informational handout on diagnosis.  PT discharged.

## 2022-09-27 ENCOUNTER — HOSPITAL ENCOUNTER (OUTPATIENT)
Facility: MEDICAL CENTER | Age: 42
End: 2022-09-27
Attending: FAMILY MEDICINE
Payer: COMMERCIAL

## 2022-09-27 ENCOUNTER — OFFICE VISIT (OUTPATIENT)
Dept: URGENT CARE | Facility: CLINIC | Age: 42
End: 2022-09-27
Payer: COMMERCIAL

## 2022-09-27 VITALS
OXYGEN SATURATION: 100 % | TEMPERATURE: 98 F | DIASTOLIC BLOOD PRESSURE: 72 MMHG | SYSTOLIC BLOOD PRESSURE: 118 MMHG | WEIGHT: 175 LBS | HEIGHT: 62 IN | HEART RATE: 80 BPM | BODY MASS INDEX: 32.2 KG/M2 | RESPIRATION RATE: 16 BRPM

## 2022-09-27 DIAGNOSIS — J02.9 PHARYNGITIS, UNSPECIFIED ETIOLOGY: ICD-10-CM

## 2022-09-27 LAB
INT CON NEG: NORMAL
INT CON POS: NORMAL
S PYO AG THROAT QL: NEGATIVE

## 2022-09-27 PROCEDURE — 99213 OFFICE O/P EST LOW 20 MIN: CPT | Performed by: FAMILY MEDICINE

## 2022-09-27 PROCEDURE — 87880 STREP A ASSAY W/OPTIC: CPT | Performed by: FAMILY MEDICINE

## 2022-09-27 PROCEDURE — 87077 CULTURE AEROBIC IDENTIFY: CPT

## 2022-09-27 PROCEDURE — 87070 CULTURE OTHR SPECIMN AEROBIC: CPT

## 2022-09-27 RX ORDER — LIDOCAINE HYDROCHLORIDE 20 MG/ML
15 SOLUTION OROPHARYNGEAL EVERY 6 HOURS PRN
Qty: 100 ML | Refills: 1 | Status: SHIPPED | OUTPATIENT
Start: 2022-09-27

## 2022-09-27 ASSESSMENT — ENCOUNTER SYMPTOMS
FEVER: 1
SORE THROAT: 1

## 2022-09-27 NOTE — PROGRESS NOTES
"AndSubjective:     Yudy De Jesus is a 42 y.o. female who presents for Sore Throat (Sore throat , bilateral ear pain and fevers x 3 days )    HPI  Pt presents for evaluation of an acute problem  Patient with sore throat and ear pain for the past 3 days  Having fevers at home  Sore throat is mostly when swallowing   Having some myalgias, but those are resolving   No vomiting or diarrhea   No cough or congestion     Review of Systems   Constitutional:  Positive for fever.   HENT:  Positive for ear pain and sore throat.      PMH:  has a past medical history of Arrhythmia.  MEDS:   Current Outpatient Medications:     metoprolol (LOPRESSOR) 50 MG Tab, Take 1 Tab by mouth every day., Disp: 90 Tab, Rfl: 3  ALLERGIES: No Known Allergies  SURGHX:   Past Surgical History:   Procedure Laterality Date    HYSTEROSCOPY NOVASURE-2 N/A 3/3/2016    Procedure: HYSTEROSCOPY NOVASURE;  Surgeon: David Berry M.D.;  Location: SURGERY SAME DAY [facility];  Service:     GYN SURGERY  6/2007    C section     SOCHX:  reports that she quit smoking about 6 years ago. Her smoking use included cigarettes. She has never used smokeless tobacco. She reports that she does not drink alcohol and does not use drugs.     Objective:   /72 (BP Location: Left arm, Patient Position: Sitting, BP Cuff Size: Adult)   Pulse 80   Temp 36.7 °C (98 °F) (Temporal)   Resp 16   Ht 1.575 m (5' 2\")   Wt 79.4 kg (175 lb)   SpO2 100%   BMI 32.01 kg/m²     Physical Exam  Constitutional:       General: She is not in acute distress.     Appearance: She is well-developed. She is not diaphoretic.   HENT:      Head: Normocephalic and atraumatic.      Right Ear: Tympanic membrane, ear canal and external ear normal.      Left Ear: Tympanic membrane, ear canal and external ear normal.      Nose: Nose normal.      Mouth/Throat:      Mouth: Mucous membranes are moist.      Comments: Moderate erythema in posterior pharynx, few tonsillar exudates, tonsils 2+ " bilaterally, no unilateral swelling, no uvular deviation  Neck:      Trachea: No tracheal deviation.   Cardiovascular:      Rate and Rhythm: Normal rate and regular rhythm.   Pulmonary:      Effort: Pulmonary effort is normal. No respiratory distress.      Breath sounds: Normal breath sounds. No wheezing or rales.   Musculoskeletal:      Cervical back: Normal range of motion and neck supple. No tenderness.   Lymphadenopathy:      Cervical: No cervical adenopathy.   Skin:     General: Skin is warm and dry.      Findings: No rash.   Neurological:      Mental Status: She is alert.     Assessment/Plan:   Assessment    1. Pharyngitis, unspecified etiology  - POCT Rapid Strep A  - CULTURE THROAT; Future  - lidocaine (XYLOCAINE) 2 % Solution; Take 15 mL by mouth every 6 hours as needed for Throat/Mouth Pain.  Dispense: 100 mL; Refill: 1    Patient with pharyngitis which is likely viral.  Rapid strep negative.  Culture to confirm.  Given lidocaine for supportive care.  Follow-up culture results.

## 2022-09-29 ENCOUNTER — TELEPHONE (OUTPATIENT)
Dept: URGENT CARE | Facility: PHYSICIAN GROUP | Age: 42
End: 2022-09-29
Payer: COMMERCIAL

## 2022-09-29 DIAGNOSIS — A49.1 STREPTOCOCCAL INFECTION GROUP C: ICD-10-CM

## 2022-09-29 RX ORDER — AMOXICILLIN 500 MG/1
500 CAPSULE ORAL 2 TIMES DAILY
Qty: 20 CAPSULE | Refills: 0 | Status: SHIPPED | OUTPATIENT
Start: 2022-09-29 | End: 2022-10-09

## 2022-09-30 LAB
BACTERIA SPEC RESP CULT: ABNORMAL
BACTERIA SPEC RESP CULT: ABNORMAL
SIGNIFICANT IND 70042: ABNORMAL
SITE SITE: ABNORMAL
SOURCE SOURCE: ABNORMAL

## 2022-10-31 ENCOUNTER — OFFICE VISIT (OUTPATIENT)
Dept: MEDICAL GROUP | Facility: MEDICAL CENTER | Age: 42
End: 2022-10-31
Payer: COMMERCIAL

## 2022-10-31 VITALS
HEIGHT: 62 IN | BODY MASS INDEX: 27.75 KG/M2 | TEMPERATURE: 97.5 F | DIASTOLIC BLOOD PRESSURE: 56 MMHG | OXYGEN SATURATION: 100 % | SYSTOLIC BLOOD PRESSURE: 94 MMHG | HEART RATE: 80 BPM | WEIGHT: 150.79 LBS

## 2022-10-31 DIAGNOSIS — G43.909 MIGRAINE WITHOUT STATUS MIGRAINOSUS, NOT INTRACTABLE, UNSPECIFIED MIGRAINE TYPE: ICD-10-CM

## 2022-10-31 DIAGNOSIS — Z00.00 HEALTHCARE MAINTENANCE: ICD-10-CM

## 2022-10-31 DIAGNOSIS — Z12.31 ENCOUNTER FOR SCREENING MAMMOGRAM FOR BREAST CANCER: ICD-10-CM

## 2022-10-31 DIAGNOSIS — F41.9 ANXIETY: ICD-10-CM

## 2022-10-31 DIAGNOSIS — E78.5 HYPERLIPIDEMIA, UNSPECIFIED HYPERLIPIDEMIA TYPE: ICD-10-CM

## 2022-10-31 DIAGNOSIS — E55.9 VITAMIN D DEFICIENCY: ICD-10-CM

## 2022-10-31 DIAGNOSIS — I47.10 SVT (SUPRAVENTRICULAR TACHYCARDIA) (HCC): ICD-10-CM

## 2022-10-31 PROCEDURE — 99214 OFFICE O/P EST MOD 30 MIN: CPT | Performed by: INTERNAL MEDICINE

## 2022-10-31 RX ORDER — SUMATRIPTAN 100 MG/1
100 TABLET, FILM COATED ORAL
COMMUNITY
End: 2022-10-31 | Stop reason: SDUPTHER

## 2022-10-31 RX ORDER — ESCITALOPRAM OXALATE 10 MG/1
TABLET ORAL
Qty: 83 TABLET | Refills: 1 | Status: SHIPPED | OUTPATIENT
Start: 2022-10-31 | End: 2023-11-27

## 2022-10-31 RX ORDER — METOPROLOL TARTRATE 50 MG/1
50 TABLET, FILM COATED ORAL DAILY
Qty: 90 TABLET | Refills: 3 | Status: SHIPPED | OUTPATIENT
Start: 2022-10-31

## 2022-10-31 RX ORDER — SUMATRIPTAN 100 MG/1
100 TABLET, FILM COATED ORAL
Qty: 9 TABLET | Refills: 11 | Status: SHIPPED | OUTPATIENT
Start: 2022-10-31

## 2022-10-31 ASSESSMENT — PATIENT HEALTH QUESTIONNAIRE - PHQ9: CLINICAL INTERPRETATION OF PHQ2 SCORE: 0

## 2022-10-31 ASSESSMENT — ENCOUNTER SYMPTOMS
COUGH: 0
FEVER: 0
CHILLS: 0
NERVOUS/ANXIOUS: 1
SHORTNESS OF BREATH: 0
DEPRESSION: 0
SORE THROAT: 0

## 2022-10-31 NOTE — ASSESSMENT & PLAN NOTE
Chronic, stable, no longer sees cardiologist.  Currently taking Lopressor 50 mg daily.  Continue without changes.

## 2022-10-31 NOTE — ASSESSMENT & PLAN NOTE
This is a new problem, related to family stress.  Patient has no suicidal thoughts or ideations, working with therapist on regular basis. She is interested in pharmacotherapy.  Shared decision to proceed with escitalopram.   - Start escitalopram 5 mg daily, increase to 10 mg daily after 2 weeks.   - Continue psychotherapy.   - Return  care if symptoms worsen or fail to improve.

## 2022-10-31 NOTE — ASSESSMENT & PLAN NOTE
This is a chronic problem, attacks ranging from 2 to 3/months to 2 to 3/week.  Managed with sumatriptan 100 mg tablet daily as needed.   - Advised on hydration, magnesium supplementation.

## 2022-10-31 NOTE — PROGRESS NOTES
Subjective:     Chief Complaint   Patient presents with    Medication Refill      Diagnoses of Healthcare maintenance, Hyperlipidemia, unspecified hyperlipidemia type, SVT (supraventricular tachycardia) (HCC), Migraine without status migrainosus, not intractable, unspecified migraine type, Vitamin D deficiency, Anxiety, and Encounter for screening mammogram for breast cancer were pertinent to this visit.    HISTORY OF THE PRESENT ILLNESS: Patient is a 42 y.o. female. This pleasant patient is here today to establish care discuss problems listed below.   Problem   Anxiety    This is an ongoing problem, patient has been a lot of stress in the last few months.  Her beloved nephew has  in fortune of motor vehicle accident, she is going through divorce with her .  She is working with therapist once a week, however she is interested in medication to take on a daily basis.  She denies any suicidal thoughts or ideations.        Migraine Without Status Migrainosus, Not Intractable    This is a chronic problem, patient has anywhere from 2-3 attacks per months to 2-3 attacks per week.  She notices that attacks are more frequent when she is not drinking enough water.  She takes sumatriptan 100 mg as needed.       SVT (supraventricular tachycardia) (HCC)    This is a chronic problem, controlled on Lopressor 50 mg tablet daily.  Patient no longer sees cardiology.        Past Medical History:   Diagnosis Date    Arrhythmia     SVT; no issues with SVT since     Migraines      Past Surgical History:   Procedure Laterality Date    HYSTEROSCOPY NOVASURE-2 N/A 3/3/2016    Procedure: HYSTEROSCOPY NOVASURE;  Surgeon: David Berry M.D.;  Location: SURGERY SAME DAY [facility];  Service:     GYN SURGERY  2007    C section     Family History   Problem Relation Age of Onset    Lung Disease Maternal Grandmother         emphysema    Cancer Maternal Grandmother         possible Colon Ca    Diabetes Neg Hx     Hypertension Neg  "Hx      Social History     Tobacco Use    Smoking status: Every Day     Types: Cigarettes     Last attempt to quit: 2015     Years since quittin.8    Smokeless tobacco: Never    Tobacco comments:     Back to smoking due to stress    Vaping Use    Vaping Use: Never used   Substance Use Topics    Alcohol use: No     Alcohol/week: 0.0 oz     Comment: 1 per week    Drug use: No     Current Outpatient Medications Ordered in Epic   Medication Sig Dispense Refill    metoprolol tartrate (LOPRESSOR) 50 MG Tab Take 1 Tablet by mouth every day. 90 Tablet 3    sumatriptan (IMITREX) 100 MG tablet Take 1 Tablet by mouth one time as needed for Migraine. 9 Tablet 11    escitalopram (LEXAPRO) 10 MG Tab Take 0.5 Tablets by mouth every day for 14 days, THEN 1 Tablet every day for 76 days. 83 Tablet 1    lidocaine (XYLOCAINE) 2 % Solution Take 15 mL by mouth every 6 hours as needed for Throat/Mouth Pain. (Patient not taking: Reported on 10/31/2022) 100 mL 1     No current [facility]-ordered facility-administered medications on file.     Health Maintenance: Patient declines influenza vaccine, advised on Tdap, mammogram-order provided, Pap smear-will schedule with gynecology.    Review of Systems   Constitutional:  Negative for chills, fever and malaise/fatigue.   HENT:  Negative for sore throat.    Respiratory:  Negative for cough and shortness of breath.    Cardiovascular:  Negative for chest pain.   Psychiatric/Behavioral:  Negative for depression and suicidal ideas. The patient is nervous/anxious.      Objective:     Exam: BP (!) 94/56 (BP Location: Left arm, Patient Position: Sitting, BP Cuff Size: Adult)   Pulse 80   Temp 36.4 °C (97.5 °F) (Temporal)   Ht 1.575 m (5' 2\")   Wt 68.4 kg (150 lb 12.7 oz)   SpO2 100%  Body mass index is 27.58 kg/m².    Physical Exam  Constitutional:       Appearance: Normal appearance.   HENT:      Head: Normocephalic and atraumatic.      Nose: Nose normal. No congestion or rhinorrhea. "   Cardiovascular:      Rate and Rhythm: Normal rate and regular rhythm.      Pulses: Normal pulses.      Heart sounds: Normal heart sounds. No murmur heard.  Pulmonary:      Effort: Pulmonary effort is normal. No respiratory distress.      Breath sounds: Normal breath sounds.   Neurological:      Mental Status: She is alert and oriented to person, place, and time.   Psychiatric:         Mood and Affect: Mood normal.     Labs: per orders     Assessment & Plan:   42 y.o. female with the following -    Problem List Items Addressed This Visit       Migraine without status migrainosus, not intractable (Chronic)     This is a chronic problem, attacks ranging from 2 to 3/months to 2 to 3/week.  Managed with sumatriptan 100 mg tablet daily as needed.   - Advised on hydration, magnesium supplementation.         Relevant Medications    metoprolol tartrate (LOPRESSOR) 50 MG Tab    sumatriptan (IMITREX) 100 MG tablet    escitalopram (LEXAPRO) 10 MG Tab    Anxiety     This is a new problem, related to family stress.  Patient has no suicidal thoughts or ideations, working with therapist on regular basis. She is interested in pharmacotherapy.  Shared decision to proceed with escitalopram.   - Start escitalopram 5 mg daily, increase to 10 mg daily after 2 weeks.   - Continue psychotherapy.   - Return  care if symptoms worsen or fail to improve.           Relevant Medications    escitalopram (LEXAPRO) 10 MG Tab    SVT (supraventricular tachycardia) (HCC)     Chronic, stable, no longer sees cardiologist.  Currently taking Lopressor 50 mg daily.  Continue without changes.         Relevant Medications    metoprolol tartrate (LOPRESSOR) 50 MG Tab    Other Relevant Orders    TSH WITH REFLEX TO FT4     Other Visit Diagnoses       Healthcare maintenance        Relevant Orders    CBC WITH DIFFERENTIAL    Comp Metabolic Panel    Hyperlipidemia, unspecified hyperlipidemia type        Relevant Medications    metoprolol tartrate (LOPRESSOR) 50  MG Tab    Other Relevant Orders    Lipid Profile    Vitamin D deficiency        Relevant Orders    VITAMIN D,25 HYDROXY (DEFICIENCY)    Encounter for screening mammogram for breast cancer        Relevant Orders    MA-SCREENING MAMMO BILAT W/TOMOSYNTHESIS W/CAD          Return in about 1 year (around 10/31/2023), or if symptoms worsen or fail to improve, for annual wellness visit.    Please note that this dictation was created using voice recognition software. I have made every reasonable attempt to correct obvious errors, but I expect that there are errors of grammar and possibly content that I did not discover before finalizing the note.

## 2022-12-28 ENCOUNTER — HOSPITAL ENCOUNTER (OUTPATIENT)
Dept: RADIOLOGY | Facility: MEDICAL CENTER | Age: 42
End: 2022-12-28
Attending: INTERNAL MEDICINE
Payer: COMMERCIAL

## 2022-12-28 DIAGNOSIS — Z12.31 ENCOUNTER FOR SCREENING MAMMOGRAM FOR BREAST CANCER: ICD-10-CM

## 2022-12-28 PROCEDURE — 77063 BREAST TOMOSYNTHESIS BI: CPT

## 2023-11-23 DIAGNOSIS — F41.9 ANXIETY: ICD-10-CM

## 2023-11-27 RX ORDER — ESCITALOPRAM OXALATE 10 MG/1
10 TABLET ORAL DAILY
Qty: 30 TABLET | Refills: 0 | Status: SHIPPED | OUTPATIENT
Start: 2023-11-27 | End: 2023-12-27